# Patient Record
Sex: MALE | Race: BLACK OR AFRICAN AMERICAN
[De-identification: names, ages, dates, MRNs, and addresses within clinical notes are randomized per-mention and may not be internally consistent; named-entity substitution may affect disease eponyms.]

---

## 2017-08-25 ENCOUNTER — HOSPITAL ENCOUNTER (OUTPATIENT)
Dept: HOSPITAL 62 - OD | Age: 67
End: 2017-08-25
Attending: ORTHOPAEDIC SURGERY
Payer: COMMERCIAL

## 2017-08-25 DIAGNOSIS — M25.561: Primary | ICD-10-CM

## 2017-08-25 LAB
ANION GAP SERPL CALC-SCNC: 10 MMOL/L (ref 5–19)
BASOPHILS # BLD AUTO: 0 10^3/UL (ref 0–0.2)
BASOPHILS NFR BLD AUTO: 0.3 % (ref 0–2)
BUN SERPL-MCNC: 44 MG/DL (ref 7–20)
CALCIUM: 9.2 MG/DL (ref 8.4–10.2)
CHLORIDE SERPL-SCNC: 104 MMOL/L (ref 98–107)
CO2 SERPL-SCNC: 28 MMOL/L (ref 22–30)
CREAT SERPL-MCNC: 3.05 MG/DL (ref 0.52–1.25)
EOSINOPHIL # BLD AUTO: 0.1 10^3/UL (ref 0–0.6)
EOSINOPHIL NFR BLD AUTO: 1.4 % (ref 0–6)
ERYTHROCYTE [DISTWIDTH] IN BLOOD BY AUTOMATED COUNT: 14.3 % (ref 11.5–14)
GLUCOSE SERPL-MCNC: 211 MG/DL (ref 75–110)
HCT VFR BLD CALC: 35 % (ref 37.9–51)
HGB BLD-MCNC: 12.1 G/DL (ref 13.5–17)
HGB HCT DIFFERENCE: 1.3
LYMPHOCYTES # BLD AUTO: 0.7 10^3/UL (ref 0.5–4.7)
LYMPHOCYTES NFR BLD AUTO: 19.1 % (ref 13–45)
MCH RBC QN AUTO: 30 PG (ref 27–33.4)
MCHC RBC AUTO-ENTMCNC: 34.6 G/DL (ref 32–36)
MCV RBC AUTO: 87 FL (ref 80–97)
MONOCYTES # BLD AUTO: 0.2 10^3/UL (ref 0.1–1.4)
MONOCYTES NFR BLD AUTO: 6.3 % (ref 3–13)
NEUTROPHILS # BLD AUTO: 2.8 10^3/UL (ref 1.7–8.2)
NEUTS SEG NFR BLD AUTO: 72.9 % (ref 42–78)
POTASSIUM SERPL-SCNC: 3.6 MMOL/L (ref 3.6–5)
RBC # BLD AUTO: 4.05 10^6/UL (ref 4.35–5.55)
SODIUM SERPL-SCNC: 141.8 MMOL/L (ref 137–145)
WBC # BLD AUTO: 3.8 10^3/UL (ref 4–10.5)

## 2017-08-25 PROCEDURE — 85025 COMPLETE CBC W/AUTO DIFF WBC: CPT

## 2017-08-25 PROCEDURE — 93005 ELECTROCARDIOGRAM TRACING: CPT

## 2017-08-25 PROCEDURE — 36415 COLL VENOUS BLD VENIPUNCTURE: CPT

## 2017-08-25 PROCEDURE — 93010 ELECTROCARDIOGRAM REPORT: CPT

## 2017-08-25 PROCEDURE — 80048 BASIC METABOLIC PNL TOTAL CA: CPT

## 2017-08-26 NOTE — EKG REPORT
SEVERITY:- OTHERWISE NORMAL ECG -

SINUS RHYTHM

BORDERLINE LEFT AXIS DEVIATION

:

Confirmed by: Jake Coyne 26-Aug-2017 00:00:05

## 2019-09-11 ENCOUNTER — HOSPITAL ENCOUNTER (OUTPATIENT)
Dept: HOSPITAL 62 - SC | Age: 69
Discharge: HOME | End: 2019-09-11
Attending: OPHTHALMOLOGY
Payer: OTHER GOVERNMENT

## 2019-09-11 DIAGNOSIS — Z96.41: ICD-10-CM

## 2019-09-11 DIAGNOSIS — Z79.899: ICD-10-CM

## 2019-09-11 DIAGNOSIS — E11.9: ICD-10-CM

## 2019-09-11 DIAGNOSIS — Z87.891: ICD-10-CM

## 2019-09-11 DIAGNOSIS — H25.11: Primary | ICD-10-CM

## 2019-09-11 DIAGNOSIS — Z79.4: ICD-10-CM

## 2019-09-11 DIAGNOSIS — H40.1113: ICD-10-CM

## 2019-09-11 DIAGNOSIS — I10: ICD-10-CM

## 2019-09-11 PROCEDURE — 0191T: CPT

## 2019-09-11 PROCEDURE — C1783 OCULAR IMP, AQUEOUS DRAIN DE: HCPCS

## 2019-09-11 PROCEDURE — V2632 POST CHMBR INTRAOCULAR LENS: HCPCS

## 2019-09-11 PROCEDURE — 66984 XCAPSL CTRC RMVL W/O ECP: CPT

## 2019-09-11 PROCEDURE — 82962 GLUCOSE BLOOD TEST: CPT

## 2019-09-11 RX ADMIN — CYCLOPENTOLATE HYDROCHLORIDE AND PHENYLEPHRINE HYDROCHLORIDE PRN DROP: 2; 10 SOLUTION/ DROPS OPHTHALMIC at 07:21

## 2019-09-11 RX ADMIN — TETRACAINE HYDROCHLORIDE PRN DROP: 5 SOLUTION OPHTHALMIC at 07:48

## 2019-09-11 RX ADMIN — BESIFLOXACIN PRN DROP: 6 SUSPENSION OPHTHALMIC at 07:22

## 2019-09-11 RX ADMIN — TROPICAMIDE PRN DROP: 10 SOLUTION/ DROPS OPHTHALMIC at 07:21

## 2019-09-11 RX ADMIN — CYCLOPENTOLATE HYDROCHLORIDE AND PHENYLEPHRINE HYDROCHLORIDE PRN DROP: 2; 10 SOLUTION/ DROPS OPHTHALMIC at 07:34

## 2019-09-11 RX ADMIN — TROPICAMIDE PRN DROP: 10 SOLUTION/ DROPS OPHTHALMIC at 07:34

## 2019-09-11 RX ADMIN — BESIFLOXACIN PRN DROP: 6 SUSPENSION OPHTHALMIC at 08:19

## 2019-09-11 RX ADMIN — TROPICAMIDE PRN DROP: 10 SOLUTION/ DROPS OPHTHALMIC at 06:59

## 2019-09-11 RX ADMIN — TETRACAINE HYDROCHLORIDE PRN DROP: 5 SOLUTION OPHTHALMIC at 07:01

## 2019-09-11 RX ADMIN — BESIFLOXACIN PRN DROP: 6 SUSPENSION OPHTHALMIC at 07:00

## 2019-09-11 RX ADMIN — TETRACAINE HYDROCHLORIDE PRN DROP: 5 SOLUTION OPHTHALMIC at 07:36

## 2019-09-11 RX ADMIN — CYCLOPENTOLATE HYDROCHLORIDE AND PHENYLEPHRINE HYDROCHLORIDE PRN DROP: 2; 10 SOLUTION/ DROPS OPHTHALMIC at 06:59

## 2019-09-25 ENCOUNTER — HOSPITAL ENCOUNTER (OUTPATIENT)
Dept: HOSPITAL 62 - SC | Age: 69
Discharge: HOME | End: 2019-09-25
Attending: OPHTHALMOLOGY
Payer: OTHER GOVERNMENT

## 2019-09-25 DIAGNOSIS — G62.9: ICD-10-CM

## 2019-09-25 DIAGNOSIS — Z79.4: ICD-10-CM

## 2019-09-25 DIAGNOSIS — H40.1123: ICD-10-CM

## 2019-09-25 DIAGNOSIS — Z87.891: ICD-10-CM

## 2019-09-25 DIAGNOSIS — M10.9: ICD-10-CM

## 2019-09-25 DIAGNOSIS — Z98.41: ICD-10-CM

## 2019-09-25 DIAGNOSIS — E11.9: ICD-10-CM

## 2019-09-25 DIAGNOSIS — H25.12: Primary | ICD-10-CM

## 2019-09-25 DIAGNOSIS — I10: ICD-10-CM

## 2019-09-25 DIAGNOSIS — Z96.41: ICD-10-CM

## 2019-09-25 DIAGNOSIS — Z79.899: ICD-10-CM

## 2019-09-25 PROCEDURE — 0191T: CPT

## 2019-09-25 PROCEDURE — 66984 XCAPSL CTRC RMVL W/O ECP: CPT

## 2019-09-25 PROCEDURE — V2632 POST CHMBR INTRAOCULAR LENS: HCPCS

## 2019-09-25 PROCEDURE — 82962 GLUCOSE BLOOD TEST: CPT

## 2019-09-25 PROCEDURE — C1783 OCULAR IMP, AQUEOUS DRAIN DE: HCPCS

## 2019-09-25 RX ADMIN — TETRACAINE HYDROCHLORIDE PRN DROP: 5 SOLUTION OPHTHALMIC at 09:11

## 2019-09-25 RX ADMIN — BESIFLOXACIN PRN DROP: 6 SUSPENSION OPHTHALMIC at 09:30

## 2019-09-25 RX ADMIN — CYCLOPENTOLATE HYDROCHLORIDE AND PHENYLEPHRINE HYDROCHLORIDE PRN DROP: 2; 10 SOLUTION/ DROPS OPHTHALMIC at 09:20

## 2019-09-25 RX ADMIN — BESIFLOXACIN PRN DROP: 6 SUSPENSION OPHTHALMIC at 09:10

## 2019-09-25 RX ADMIN — TROPICAMIDE PRN DROP: 10 SOLUTION/ DROPS OPHTHALMIC at 09:30

## 2019-09-25 RX ADMIN — BESIFLOXACIN PRN DROP: 6 SUSPENSION OPHTHALMIC at 10:08

## 2019-09-25 RX ADMIN — CYCLOPENTOLATE HYDROCHLORIDE AND PHENYLEPHRINE HYDROCHLORIDE PRN DROP: 2; 10 SOLUTION/ DROPS OPHTHALMIC at 09:30

## 2019-09-25 RX ADMIN — TETRACAINE HYDROCHLORIDE PRN DROP: 5 SOLUTION OPHTHALMIC at 09:30

## 2019-09-25 RX ADMIN — CYCLOPENTOLATE HYDROCHLORIDE AND PHENYLEPHRINE HYDROCHLORIDE PRN DROP: 2; 10 SOLUTION/ DROPS OPHTHALMIC at 09:10

## 2019-09-25 RX ADMIN — TROPICAMIDE PRN DROP: 10 SOLUTION/ DROPS OPHTHALMIC at 09:20

## 2019-09-25 RX ADMIN — TETRACAINE HYDROCHLORIDE PRN DROP: 5 SOLUTION OPHTHALMIC at 09:48

## 2019-09-25 RX ADMIN — TROPICAMIDE PRN DROP: 10 SOLUTION/ DROPS OPHTHALMIC at 09:10
